# Patient Record
Sex: MALE | Race: BLACK OR AFRICAN AMERICAN
[De-identification: names, ages, dates, MRNs, and addresses within clinical notes are randomized per-mention and may not be internally consistent; named-entity substitution may affect disease eponyms.]

---

## 2018-05-15 NOTE — DIAGNOSTIC IMAGING REPORT
PROCEDURE:

Frontal and lateral views of the chest.

 

COMPARISON: None.

 

INDICATIONS:   PREOP - FOOT SX

     

FINDINGS:

Lines/tubes:  None.

 

Lungs:  The lungs are well inflated. Linear opacities in the lingula, 

likely reflecting subsegmental atelectasis or scarring. There is no 

evidence of pneumonia or pulmonary edema.

 

Pleura:  There is no pleural effusion or pneumothorax.

 

Heart and mediastinum:  The heart and the mediastinum are normal.

 

Bones:  No acute bony abnormality.

 

IMPRESSION: 

 

1.  No acute cardiopulmonary abnormalities.

 

 

Milton Kennedy M.D.  

Dictated by:  Milton Kennedy M.D. on 5/15/2018 at 14:14     

Electronically approved by:  Milton Kennedy M.D. on 5/15/2018 at 

14:14

## 2018-05-16 ENCOUNTER — HOSPITAL ENCOUNTER (OUTPATIENT)
Dept: HOSPITAL 88 - OR | Age: 68
Discharge: HOME | End: 2018-05-16
Attending: PODIATRIST
Payer: MEDICARE

## 2018-05-16 DIAGNOSIS — Z88.0: ICD-10-CM

## 2018-05-16 DIAGNOSIS — Z87.891: ICD-10-CM

## 2018-05-16 DIAGNOSIS — M67.471: Primary | ICD-10-CM

## 2018-05-16 DIAGNOSIS — I10: ICD-10-CM

## 2018-05-16 DIAGNOSIS — E03.9: ICD-10-CM

## 2018-05-16 DIAGNOSIS — Z01.818: ICD-10-CM

## 2018-05-16 DIAGNOSIS — Z86.73: ICD-10-CM

## 2018-05-16 DIAGNOSIS — Z79.82: ICD-10-CM

## 2018-05-16 PROCEDURE — 71046 X-RAY EXAM CHEST 2 VIEWS: CPT

## 2018-05-16 PROCEDURE — 28092 REMOVAL OF TOE LESIONS: CPT

## 2018-05-16 PROCEDURE — 88304 TISSUE EXAM BY PATHOLOGIST: CPT

## 2018-05-16 NOTE — XMS REPORT
Patient Summary Document

 Created on: 2018



MELANI LÓPEZ

External Reference #: [id number]

: 1950

Sex: Male



Demographics







 Address  [address] [place], [place] [address]

 

 Home Phone  [phone]

 

 Preferred Language  Unknown

 

 Marital Status  Unknown

 

 Bahai Affiliation  Unknown

 

 Race  Unknown

 

 Additional Race(s)  

 

 Ethnic Group  Unknown





Author







 [facility]  [facility]

 

 Address  Unknown

 

 Phone  Unavailable







Care Team Providers







 Care Team Member Name  Role  Phone

 

 RAN AYALA  Unavailable  Unavailable







Problems

This patient has no known problems.



Allergies, Adverse Reactions, Alerts

This patient has no known allergies or adverse reactions.



Medications

This patient has no known medications.



Results







 Test Description  Test Time  Test Comments  Text Results  Atomic Results  
Result Comments









 CHEST 2 VIEWS            [place] [address]      Patient Name: MELANI LÓPEZ   
MR #: [id number]    : 1950 Age/Sex: 67/M  Acct #: [id number] Req #: 
[id number]  Adm Physician:     Ordered by: ISAIAH GORDON MD  Report #: [id number]   
Location: OR  Room/Bed:     ____________________________________________________
_______________________________________________    Procedure: [id number] DX/
CHEST 2 VIEWS  Exam Date: 05/15/18                            Exam Time: 1355  
     REPORT STATUS: Signed    PROCEDURE:   Frontal and lateral views of the 
chest.       COMPARISON: None.       INDICATIONS:   PREOP - FOOT SX           
FINDINGS:   Lines/tubes:  None.       Lungs:  The lungs are well inflated. 
Linear opacities in the lingula,    likely reflecting subsegmental atelectasis 
or scarring. There is no    evidence of pneumonia or pulmonary edema.       
Pleura:  There is no pleural effusion or pneumothorax.       Heart and 
mediastinum:  The heart and the mediastinum are normal.       Bones:  No acute 
bony abnormality.       IMPRESSION:        1.  No acute cardiopulmonary 
abnormalities.           Sydni Kennedy M.D.     Dictated by:  Sydni Kennedy M.D. on 5/15/2018 at 14:14        Electronically approved by:  Sydni Kennedy M.D. on 5/15/2018 at    14:14                Dictated By: SYDNI KENNEDY MD  Electronically Signed By: SYDNI KENNEDY MD on 05/15/18 1414  
Transcribed By: FLAVIA on 05/15/18 1414       COPY TO:   ISAIAH GORDON MD

## 2018-05-21 NOTE — OPERATIVE REPORT
DATE OF PROCEDURE:  May 16, 2018 

 

ASSISTANT:  None.



PREOPERATIVE DIAGNOSIS:  Right foot third digit cyst, ganglion cyst 

formation.



POSTOPERATIVE DIAGNOSIS:  Right foot third digit cyst, ganglion cyst 

formation.



PROCEDURE

1. Right foot third digit excision of ganglion cyst.

2. Right foot tibial nerve block.



HEMOSTASIS:  Ankle tourniquet at 250 mmHg.



INJECTABLES:  10 mL of 0.5% Marcaine plain.



PATHOLOGY:  Cyst sent to pathology.



PROCEDURE:  After informed consent was obtained, patient was brought to the 

operating room, placed on the operating table in supine position.  General 

anesthesia was obtained.  Pneumatic tourniquet was applied to the right 

ankle.  The right lower extremity was then scrubbed, prepped and draped in 

the usual aseptic manner.  Attention was then directed to the right foot 

where it was elevated for approximately 1 minute, and the ankle tourniquet 

was inflated with 250 mmHg.  Attention was directed to the right foot third 

digit where a cystic formation was noted along the dorsal aspect of the 

proximal interphalangeal joint.  The cyst was excised in an elliptical 

fashion down to the level of bone.  All bleeders were ligated, cauterized 

as necessary.  Next, the cyst was sent to pathology as specimen.  The wound 

was irrigated with copious amounts of saline.  The wound was then coapted 

utilizing 3-0 Vicryl and 4-0 nylon.  The surgical site was then 

anesthetized utilizing 5 mL of 100% Marcaine plain followed by a right 

tibial nerve block consisting of 5 mL of 0.5% Marcaine plain.  The right 

foot was then placed in dry sterile dressing consisting of Xeroform, 4 x 

4's, Kerlix and Ace wrap.  Pneumatic ankle tourniquet was deflated and a 

prompt hyperemic response was noted to all digits of the right lower 

extremity.



Patient tolerated the procedure and anesthesia well.  Patient was then 

transferred back to her room with vital signs stable, neurovascular status 

intact to preop status.









DD:  05/21/2018 14:22

DT:  05/21/2018 15:02

Job#:  [id number] ROMANA

## 2019-06-04 LAB
BASOPHILS # BLD AUTO: 0.1 10*3/UL (ref 0–0.1)
BASOPHILS NFR BLD AUTO: 1 % (ref 0–1)
DEPRECATED NEUTROPHILS # BLD AUTO: 2.4 10*3/UL (ref 2.1–6.9)
EOSINOPHIL # BLD AUTO: 0.2 10*3/UL (ref 0–0.4)
EOSINOPHIL NFR BLD AUTO: 3.6 % (ref 0–6)
ERYTHROCYTE [DISTWIDTH] IN CORD BLOOD: 12.7 % (ref 11.7–14.4)
HCT VFR BLD AUTO: 36.2 % (ref 38.2–49.6)
HGB BLD-MCNC: 13 G/DL (ref 14–18)
LYMPHOCYTES # BLD: 2 10*3/UL (ref 1–3.2)
LYMPHOCYTES NFR BLD AUTO: 39.9 % (ref 18–39.1)
MCH RBC QN AUTO: 32.4 PG (ref 28–32)
MCHC RBC AUTO-ENTMCNC: 35.9 G/DL (ref 31–35)
MCV RBC AUTO: 90.3 FL (ref 81–99)
MONOCYTES # BLD AUTO: 0.3 10*3/UL (ref 0.2–0.8)
MONOCYTES NFR BLD AUTO: 6.6 % (ref 4.4–11.3)
NEUTS SEG NFR BLD AUTO: 48.7 % (ref 38.7–80)
PLATELET # BLD AUTO: 262 X10E3/UL (ref 140–360)
RBC # BLD AUTO: 4.01 X10E6/UL (ref 4.3–5.7)

## 2019-06-04 NOTE — DIAGNOSTIC IMAGING REPORT
EXAMINATION: PA and lateral views of the chest.



COMPARISON: None



CLINICAL HISTORY:  Preoperative study for prostate surgery

     

DISCUSSION:



Lines/tubes:  None.



Lungs:  The lungs are well inflated and clear. There is no evidence of

pneumonia or pulmonary edema.



Pleura:  There is no pleural effusion or pneumothorax.



Heart and mediastinum:  The cardiomediastinal silhouette is normal.



Bones and soft tissues:  No acute bony abnormalities.  



IMPRESSION: 

No acute cardiopulmonary abnormalities.











Signed by: Dr. Asad Caal M.D. on 6/4/2019 3:31 PM

## 2019-06-05 ENCOUNTER — HOSPITAL ENCOUNTER (OUTPATIENT)
Dept: HOSPITAL 88 - OR | Age: 69
Discharge: HOME | End: 2019-06-05
Attending: SPECIALIST
Payer: MEDICARE

## 2019-06-05 VITALS — DIASTOLIC BLOOD PRESSURE: 76 MMHG | SYSTOLIC BLOOD PRESSURE: 132 MMHG

## 2019-06-05 DIAGNOSIS — N40.1: ICD-10-CM

## 2019-06-05 DIAGNOSIS — Z88.0: ICD-10-CM

## 2019-06-05 DIAGNOSIS — Z01.810: ICD-10-CM

## 2019-06-05 DIAGNOSIS — Z72.0: ICD-10-CM

## 2019-06-05 DIAGNOSIS — R00.1: ICD-10-CM

## 2019-06-05 DIAGNOSIS — N32.3: ICD-10-CM

## 2019-06-05 DIAGNOSIS — N41.1: ICD-10-CM

## 2019-06-05 DIAGNOSIS — I10: ICD-10-CM

## 2019-06-05 DIAGNOSIS — Z86.73: ICD-10-CM

## 2019-06-05 DIAGNOSIS — N13.8: ICD-10-CM

## 2019-06-05 DIAGNOSIS — Z01.812: ICD-10-CM

## 2019-06-05 DIAGNOSIS — Z79.82: ICD-10-CM

## 2019-06-05 DIAGNOSIS — Z01.818: ICD-10-CM

## 2019-06-05 DIAGNOSIS — K21.9: ICD-10-CM

## 2019-06-05 DIAGNOSIS — N32.89: ICD-10-CM

## 2019-06-05 DIAGNOSIS — C61: Primary | ICD-10-CM

## 2019-06-05 PROCEDURE — 76998 US GUIDE INTRAOP: CPT

## 2019-06-05 PROCEDURE — 76872 US TRANSRECTAL: CPT

## 2019-06-05 PROCEDURE — 88305 TISSUE EXAM BY PATHOLOGIST: CPT

## 2019-06-05 PROCEDURE — 85025 COMPLETE CBC W/AUTO DIFF WBC: CPT

## 2019-06-05 PROCEDURE — 36415 COLL VENOUS BLD VENIPUNCTURE: CPT

## 2019-06-05 PROCEDURE — 93005 ELECTROCARDIOGRAM TRACING: CPT

## 2019-06-05 PROCEDURE — 71046 X-RAY EXAM CHEST 2 VIEWS: CPT

## 2019-06-05 NOTE — XMS REPORT
Clinical Summary

                             Created on: 2019



Keyon Mccann

External Reference #: [id number]

: 1950

Sex: Male



Demographics







                          Address                   [address] [place] [address]  [address]

 

                          Home Phone                [phone]

 

                          Preferred Language        English

 

                          Marital Status            Unknown

 

                          Muslim Affiliation     Unknown

 

                          Race                      White

 

                          Ethnic Group              Non-





Author







                          Author                    [facility]

 

                          Organization              [facility]

 

                          Address                   Unknown

 

                          Phone                     Unavailable







Support







                Name            Relationship    Address         Phone

 

                Rossy Mccann    [employer]            Unknown         [phone]







Care Team Providers







                    Care Team Member Name    Role                Phone

 

                    Miguel A Echavarria MD    PCP                 [phone]







Allergies

Not on File



Medications

Not on file



Active Problems





Not on file



Encounters







                          Care Team                 Description



                     Date                Type                Specialty  

 

                                        



Khanh Davis MD              Enlarged prostate with urinary obstruction (Primary Dx)





                     2019          Transcribe          Access  



                                         Orders   



after 2018



Social History







                                        Date



                 Tobacco Use     Types           Packs/Day       Years Used 

 

                                         



                                         Never Assessed    









 



                           Sex Assigned at Birth     Date Recorded

 

 



                                         Not on file 









                                        Industry



                           Job Start Date            Occupation 

 

                                        Not on file



                           Not on file               Not on file 









                                        Travel End



                           Travel History            Travel Start 

 





                                         No recent travel history available.







Last Filed Vital Signs

Not on file



Plan of Treatment







   



                 Health Maintenance     Due Date        Last Done       Comments

 

   



                           COLON CANCER SCREENING     2002  

 

   



                           SHINGLES VACCINES (#1)     2002  

 

   



                           65+ PNEUMOCOCCAL VACCINE     2017  



                                         (1 of 2 - PCV13)   

 

   



                           PNEUMOCOCCAL              2017  



                                         POLYSACCHARIDE VACCINE   



                                         AGE 65 AND OVER   

 

   



                           INFLUENZA VACCINE         2019  







Procedures







                                        Comments



                 Procedure Name     Priority        Date/Time       Associated Diagnosis 

 

                                        



Results for this procedure are in the results section.



                 US PROSTATE     Routine         2019      Enlarged prostate with 



                           3:07 PM CDT               urinary obstruction 



after 2018



Results

* US Prostate (2019  3:07 PM CDT)









                                         Specimen

 











 



                           Narrative                 Performed At

 

 



                           EXAMINATION:US PROSTATE      [facility]



                                         CLINICAL HISTORY:N40.1 Benign prostatic hyperplasia with lower urinary tract

 



                                         symptoms, N13.8 Other obstructive and reflux uropathy, N40.1 



                                         COMPARISON:None. 



                                         TECHNIQUE: 



                                         Transrectal ultrasound of the prostate gland is performed. 



                                         IMPRESSION: 



                                         1.The prostate gland is enlarged at 5.2 x 3.9 x 5.4 cm. 



                                         2.There are a few central dystrophic calcifications and a few small central

 



                                         prostatic cysts measuring up to 7 mm. 



                                         [id number] 









                                        Procedure Note

 

                                        



Hm Interface, Radiology Results Incoming - 2019  3:40 PM CDT



EXAMINATION:  US PROSTATE



CLINICAL HISTORY:  N40.1 Benign prostatic hyperplasia with lower urinary tract 
symptoms, N13.8 Other obstructive and reflux uropathy, N40.1



COMPARISON:  None.





TECHNIQUE:



Transrectal ultrasound of the prostate gland is performed.





IMPRESSION:



                                        1.  The prostate gland is enlarged at 5.2 x 3.9 x 5.4 cm.

                                        2.  There are a few central dystrophic calcifications and a few small central prostatic

 cysts measuring up to 7 mm.







[id number]











   



                 Performing Organization     Address         City/State/Zipcode     Phone Number

 

   



                      [facility]          [address] [place] [address] 





after 2018



Insurance







                                        Type



            Payer      Benefit     Subscriber ID     Effective     Phone      Address 



                           Plan /                    Dates   



                                         Group     

 

                                        Medicare



              MEDICARE     MEDICARE     xxxxxxxxxxx     2017-P      [place], 



                     PART A AND          resent              [place] 



                                         B     

 

                                        Commercial



                 [employer]            AAR            xxxxxxxxxxx     3/1/2019-P   



                           SUPPLEMENT                resent   









     



            Guarantor Name     Account     Relation to     Date of     Phone      Billing Address



                     Type                Patient             Birth  

 

     



            Keyon Mccann     Personal/F     Self       1950     [phone] [address] [facility]               ([place])              [place] [address]







Advance Directives





Patient has advance care planning documents on file. For more information, nataly ospina contact:



[facility]



[address] [place] [address]

## 2019-06-06 NOTE — OPERATIVE REPORT
DATE OF PROCEDURE:  06/05/2019

 

SURGEON:  Khanh Davis MD

 

PREOPERATIVE DIAGNOSES:  

1. BPH.

2. Rule out carcinoma of the prostate.

3. Abnormal PSA.

 

POSTOPERATIVE DIAGNOSES:  

1. BPH.

2. Rule out carcinoma of the prostate.

3. Abnormal PSA.

 

OPERATION:  

1. Transrectal ultrasound of the prostate.

2. Transrectal ultrasound-guided needle biopsies.

3. Cystourethroscopy.

 

ANESTHETIC:  General.

 

INDICATIONS FOR PROCEDURE:  Mr. Mccann is a 68-year-old male who was referred with a

chief complaint of lower urinary tract obstructive symptoms and elevated PSA.  Rectal

exam showed an enlarged prostate gland with a firm to hard nodule on the right base.

His PSA was abnormal. 

 

PROCEDURE IN DETAIL:  This patient was placed on the table in the lithotomy position and

transrectal ultrasound of the prostate was performed and the prostate measured 4.82 x

3.9 x 3.7 with a total volume of 41.25.  There was a hypoechoic area in the peripheral

zone on the right side that measured 1.9 x 1.6 cm.  There were multiple prostatic

calcification with post calcification shadowing.  Both seminal vesicles were

unremarkable. 

 

Transrectal ultrasound-guided needle biopsies were obtained and multiple biopsies were

obtained from the hypoechoic area and from the normal-appearing prostate to map it for

any multifocal carcinoma. 

 

This patient was then prepped and draped in a sterile manner. 

 

A #23-English cystoscope was used and cystourethroscopy was performed and the urethra was

noted to be normal. 

 

The prostatic urethra was about 3 cm long bilobar and occlusive. 

 

Cystoscopy was then performed using both right angle and the Foroblique lens and it was

noted that the bladder mucosa was normal.  Both ureteral orifices were seen and were

within normal position configuration efflux.  The bladder wall, however, was markedly

trabeculated with cellules pole over the posterior wall and several diverticula on the

posterior wall.  The bladder was drained.  Cystoscope removed and the patient was taken

to the recovery room in satisfactory condition. 

 

Plan for this patient is to be placed on Levaquin 500 mg once a day for one week. 

 

Ultracet tablet one every 6 hours p.r.n. and was given 20.  He is to return to the

office in one week when at that time, results of the biopsy will be back and then

proceed with whatever indicated procedure. 

 

 

 

 

______________________________

Khanh Davis MD MA/GRISELDA

D:  06/05/2019 14:17:31

T:  06/06/2019 12:42:54

Job #:  [id number]

## 2019-06-06 NOTE — DIAGNOSTIC IMAGING REPORT
TECHNIQUE: Transrectal ultrasound guidance of the prostate for biopsy. 



History:  Elevated PSA



FINDINGS: 

 

The gland size measures  3.2 x 5.1 x 4.8 cm. Volume is 41.3 cm3.



The peripheral zone is homogeneous without focal nodules.



The transition zone is heterogeneous in echotexture and lobulated in contour

consistent with BPH.  

 

IMPRESSION:

 As above.   



Signed by: Dr. Kimberley Tsang MD on 6/6/2019 8:10 AM

## 2019-07-10 ENCOUNTER — HOSPITAL ENCOUNTER (OUTPATIENT)
Dept: HOSPITAL 88 - OR | Age: 69
Setting detail: OBSERVATION
LOS: 2 days | Discharge: HOME | End: 2019-07-12
Attending: SPECIALIST | Admitting: SPECIALIST
Payer: MEDICARE

## 2019-07-10 VITALS — SYSTOLIC BLOOD PRESSURE: 170 MMHG | DIASTOLIC BLOOD PRESSURE: 84 MMHG

## 2019-07-10 VITALS — HEIGHT: 74 IN | WEIGHT: 185 LBS | BODY MASS INDEX: 23.74 KG/M2

## 2019-07-10 VITALS — DIASTOLIC BLOOD PRESSURE: 74 MMHG | SYSTOLIC BLOOD PRESSURE: 135 MMHG

## 2019-07-10 VITALS — SYSTOLIC BLOOD PRESSURE: 137 MMHG | DIASTOLIC BLOOD PRESSURE: 69 MMHG

## 2019-07-10 VITALS — DIASTOLIC BLOOD PRESSURE: 84 MMHG | SYSTOLIC BLOOD PRESSURE: 170 MMHG

## 2019-07-10 DIAGNOSIS — Z01.812: ICD-10-CM

## 2019-07-10 DIAGNOSIS — C61: Primary | ICD-10-CM

## 2019-07-10 DIAGNOSIS — Z88.0: ICD-10-CM

## 2019-07-10 DIAGNOSIS — N40.1: ICD-10-CM

## 2019-07-10 DIAGNOSIS — Z79.82: ICD-10-CM

## 2019-07-10 DIAGNOSIS — N13.8: ICD-10-CM

## 2019-07-10 DIAGNOSIS — Z23: ICD-10-CM

## 2019-07-10 LAB
ANION GAP SERPL CALC-SCNC: 11.7 MMOL/L (ref 8–16)
BASOPHILS # BLD AUTO: 0 10*3/UL (ref 0–0.1)
BASOPHILS NFR BLD AUTO: 0.5 % (ref 0–1)
BUN SERPL-MCNC: 15 MG/DL (ref 7–26)
BUN/CREAT SERPL: 20 (ref 6–25)
CALCIUM SERPL-MCNC: 9.2 MG/DL (ref 8.4–10.2)
CHLORIDE SERPL-SCNC: 102 MMOL/L (ref 98–107)
CO2 SERPL-SCNC: 29 MMOL/L (ref 22–29)
DEPRECATED NEUTROPHILS # BLD AUTO: 2.2 10*3/UL (ref 2.1–6.9)
EGFRCR SERPLBLD CKD-EPI 2021: > 60 ML/MIN (ref 60–?)
EOSINOPHIL # BLD AUTO: 0.2 10*3/UL (ref 0–0.4)
EOSINOPHIL NFR BLD AUTO: 4.3 % (ref 0–6)
ERYTHROCYTE [DISTWIDTH] IN CORD BLOOD: 12.3 % (ref 11.7–14.4)
GLUCOSE SERPLBLD-MCNC: 101 MG/DL (ref 74–118)
HCT VFR BLD AUTO: 33.1 % (ref 38.2–49.6)
HGB BLD-MCNC: 11.8 G/DL (ref 14–18)
LYMPHOCYTES # BLD: 1.7 10*3/UL (ref 1–3.2)
LYMPHOCYTES NFR BLD AUTO: 38.7 % (ref 18–39.1)
MCH RBC QN AUTO: 32.6 PG (ref 28–32)
MCHC RBC AUTO-ENTMCNC: 35.6 G/DL (ref 31–35)
MCV RBC AUTO: 91.4 FL (ref 81–99)
MONOCYTES # BLD AUTO: 0.3 10*3/UL (ref 0.2–0.8)
MONOCYTES NFR BLD AUTO: 7.7 % (ref 4.4–11.3)
NEUTS SEG NFR BLD AUTO: 48.3 % (ref 38.7–80)
PLATELET # BLD AUTO: 236 X10E3/UL (ref 140–360)
POTASSIUM SERPL-SCNC: 4.7 MMOL/L (ref 3.5–5.1)
RBC # BLD AUTO: 3.62 X10E6/UL (ref 4.3–5.7)
SODIUM SERPL-SCNC: 138 MMOL/L (ref 136–145)

## 2019-07-10 PROCEDURE — 88305 TISSUE EXAM BY PATHOLOGIST: CPT

## 2019-07-10 PROCEDURE — 52601 PROSTATECTOMY (TURP): CPT

## 2019-07-10 PROCEDURE — 90732 PPSV23 VACC 2 YRS+ SUBQ/IM: CPT

## 2019-07-10 PROCEDURE — 80048 BASIC METABOLIC PNL TOTAL CA: CPT

## 2019-07-10 PROCEDURE — 36415 COLL VENOUS BLD VENIPUNCTURE: CPT

## 2019-07-10 PROCEDURE — 85025 COMPLETE CBC W/AUTO DIFF WBC: CPT

## 2019-07-10 PROCEDURE — 82948 REAGENT STRIP/BLOOD GLUCOSE: CPT

## 2019-07-10 RX ADMIN — HYDROMORPHONE HYDROCHLORIDE PRN MG: 2 INJECTION INTRAMUSCULAR; INTRAVENOUS; SUBCUTANEOUS at 23:47

## 2019-07-10 RX ADMIN — TAMSULOSIN HYDROCHLORIDE SCH MG: 0.4 CAPSULE ORAL at 20:45

## 2019-07-10 RX ADMIN — HYDROMORPHONE HYDROCHLORIDE PRN MG: 2 INJECTION INTRAMUSCULAR; INTRAVENOUS; SUBCUTANEOUS at 13:42

## 2019-07-10 RX ADMIN — PANTOPRAZOLE SODIUM SCH MG: 40 TABLET, DELAYED RELEASE ORAL at 15:54

## 2019-07-10 RX ADMIN — LEVOFLOXACIN SCH MLS/HR: 5 INJECTION, SOLUTION INTRAVENOUS at 14:04

## 2019-07-10 RX ADMIN — Medication SCH MG: at 15:54

## 2019-07-10 NOTE — OPERATIVE REPORT
DATE OF PROCEDURE:  07/10/2019

 

SURGEON:  Khanh Davis MD

 

PREOPERATIVE DIAGNOSES:  

1. Adenocarcinoma of the prostate.

2. Prostatic obstruction.

 

POSTOPERATIVE DIAGNOSES:  

1. Adenocarcinoma of the prostate.

2. Prostatic obstruction.

 

OPERATION:  

1. Urethral dilation.

2. Cystourethroscopy.

3. Transurethral resection of the prostate.

 

ANESTHETIC:  General.

 

ANESTHETIST:  LEIDY Hurley.

 

INDICATION:  Mr. Mccann is a 69-year-old male, who presented with a chief complaint of

lower urinary tract obstructive symptoms and elevated PSA of 9.5.  Transrectal

ultrasound-guided needle biopsies showed an enlarged prostate gland with adenocarcinoma

of the prostate. 

 

PROCEDURE IN DETAIL:  This patient was placed on the table in the lithotomy position and

was prepped and draped in a sterile manner after satisfactory anesthesia. 

 

A #23-Peruvian cystoscope was used, but could not be passed through the urethral meatus

and for this reason, the urethra was dilated with Barnum sound up to #28-Peruvian. 

 

A #23-Peruvian cystoscope was used and cystourethroscopy was performed and confirmed the

previous cystoscopic findings of an enlarged occlusive prostate gland.  The bladder was

markedly trabeculated with cellules and diverticula. 

 

A #27-Peruvian resectoscope sheath with a [place] obturator was then passed through the

urethra to the bladder, obturator removed and the Sung resectoscope placed.

Reinspection of the bladder and the prostate confirmed the previous cystoscopic

findings. 

 

Resection of the prostate was then started starting at 7 o'clock to 11 o'clock starting

at the bladder neck to just proximal to the verumontanum and down to the capsular

fibers.  Hemostasis was obtained all through and was very adequate. 

 

Vaporization was then started from 12 o'clock again at the bladder neck to just proximal

to the verumontanum and down to the capsular fibers. 

 

Resection was then continued from 1 o'clock to 5 o'clock again starting at the bladder

neck to just proximal to the verumontanum and down to the capsular fibers.  Again,

hemostasis was very adequate. 

 

At the termination of the procedure, the [employer] evacuator was connected to the

resectoscope sheath and evacuation of all blood clots. 

 

The resectoscope was removed.  A #22-Peruvian Fernando catheter was placed on mild traction.

Estimated blood loss was about 10 mL or less.  The patient tolerated the procedure well

and was taken to the recovery room in satisfactory condition. 

 

Plan for this patient is to be admitted overnight for observation and then we will see

him in the office in a few weeks when at that time we will start his radiation therapy

and Lupron therapy. 

 

 

 

 

______________________________

Khanh Davis MD MA/GRISELDA

D:  07/10/2019 11:48:50

T:  07/10/2019 15:55:09

Job #:  [id number]

## 2019-07-10 NOTE — XMS REPORT
Clinical Summary

                             Created on: 07/10/2019



RamyKeyon

External Reference #: [id number]

: 1950

Sex: Male



Demographics







                          Address                   [address] [place] [address]  [address]

 

                          Home Phone                [phone]

 

                          Preferred Language        English

 

                          Marital Status            Unknown

 

                          Protestant Affiliation     Unknown

 

                          Race                      White

 

                          Ethnic Group              Non-





Author







                          Author                    [facility]

 

                          Organization              [facility]

 

                          Address                   Unknown

 

                          Phone                     Unavailable







Support







                Name            Relationship    Address         Phone

 

                Rossy Mccann    [employer]            Unknown         [phone]







Care Team Providers







                    Care Team Member Name    Role                Phone

 

                    Miguel A Echavarria MD    PCP                 [phone]







Allergies

Not on File



Medications

Not on file



Active Problems





Not on file



Encounters







                          Care Team                 Description



                     Date                Type                Specialty  

 

                                        



Khanh Davis MD               



                     2019          Hospital            Radiology  



                                         Encounter   

 

                                        



Khanh Davis MD              Malignant neoplasm of prostate (HCC)



                     2019          Hospital            Radiology  



                                         Encounter   

 

                                        



Khanh Davis MD              Malignant neoplasm of prostate (HCC)



                     2019          Hospital            Radiology  



                                         Encounter   

 

                                        



Khanh Davis MD              Malignant neoplasm of prostate (HCC) (Primary Dx)



                     2019          Transcribe          Access  



                                         Orders   

 

                                        



Khanh Davis MD              Enlarged prostate with urinary obstruction (Primary Dx)





                     2019          Transcribe          Access  



                                         Orders   



after 2018



Social History







                                        Date



                 Tobacco Use     Types           Packs/Day       Years Used 

 

                                         



                                         Never Assessed    









 



                           Sex Assigned at Birth     Date Recorded

 

 



                                         Not on file 









                                        Industry



                           Job Start Date            Occupation 

 

                                        Not on file



                           Not on file               Not on file 









                                        Travel End



                           Travel History            Travel Start 

 





                                         No recent travel history available.







Last Filed Vital Signs







                                        Time Taken



                           Vital Sign                Reading 

 

                                        -



                           Blood Pressure            - 

 

                                        -



                           Pulse                     - 

 

                                        -



                           Temperature               - 

 

                                        -



                           Respiratory Rate          - 

 

                                        -



                           Oxygen Saturation         - 

 

                                        -



                           Inhaled Oxygen            - 



                                         Concentration  

 

                                        2019  1:37 PM CDT



                           Weight                    83.9 kg (185 lb) 

 

                                        -



                           Height                    - 

 

                                        -



                           Body Mass Index           - 







Plan of Treatment







   



                 Health Maintenance     Due Date        Last Done       Comments

 

   



                           COLONOSCOPY SCREENING     2002  

 

   



                           SHINGLES VACCINES (#1)     2002  

 

   



                           65+ PNEUMOCOCCAL VACCINE     2017  



                                         (1 of 2 - PCV13)   

 

   



                           INFLUENZA VACCINE         2019  







Procedures







                                        Comments



                 Procedure Name     Priority        Date/Time       Associated Diagnosis 

 

                                        



Results for this procedure are in the results section.



                 NM BONE SCAN WHOLE BODY     Routine         2019      Malignant neoplasm of 



                           12:06 PM CDT              prostate (HCC) 

 

                                        



Results for this procedure are in the results section.



                 MRI PELVIS W WO CONTRAST     Routine         2019      Malignant neoplasm of 



                           1:37 PM CDT               prostate (HCC) 

 

                                        



Results for this procedure are in the results section.



                     ESTIMATED GFR       Routine             2019  



                                         12:34 PM CDT  

 

                                        



Results for this procedure are in the results section.



                     POC CREATININE      Routine             2019  



                                         12:34 PM CDT  

 

                                        



Results for this procedure are in the results section.



                     ESTIMATED GFR       Routine             2019  



                                         12:34 PM CDT  

 

                                        



Results for this procedure are in the results section.



                     POC CREATININE      Routine             2019  



                                         12:34 PM CDT  

 

                                        



Results for this procedure are in the results section.



                 US PROSTATE     Routine         2019      Enlarged prostate with 



                           3:07 PM CDT               urinary obstruction 



after 2018



Results

* NM Bone Scan Whole Body (2019 12:06 PM CDT)









                                         Specimen

 











 



                           Narrative                 Performed At

 

 



                           PROCEDURE:NM BONE SCAN WHOLE BODY      [facility]



                                         INDICATION:Malignant neoplasm of prostate. 



                                         COMPARISON:MRI of the pelvis 2019. 



                                         TECHNIQUE: Approximately three hours after the IV administration of 25 mCi of 



                                         Tc-99m labeled MDP, routine whole body planar bone scanning was performed in the

 



                                         anterior and posterior projections. 



                                         FINDINGS:No suspicious uptake is seen to suggest the presence of osseous 



                                         metastatic disease.Degenerative uptake is present at the lumbosacral 



                                         junction. 



                                         IMPRESSION: 



                                         1.No scintigraphic evidence of osseous metastatic disease. 



                                         [facility]-[id number] 









                                        Procedure Note

 

                                        



[facility], Radiology Results Incoming - 2019  1:08 PM CDT



PROCEDURE:  NM BONE SCAN WHOLE BODY



INDICATION:  Malignant neoplasm of prostate.

 

COMPARISON:  MRI of the pelvis 2019.

 

TECHNIQUE: Approximately three hours after the IV administration of 25 mCi of 
Tc-99m labeled MDP, routine whole body planar bone scanning was performed in the
anterior and posterior projections. 

 

FINDINGS:  No suspicious uptake is seen to suggest the presence of osseous 
metastatic disease.  Degenerative uptake is present at the lumbosacral junction.

 

IMPRESSION: 

 

                                        1.  No scintigraphic evidence of osseous metastatic disease.



[facility]-[id number]









   



                 Performing Organization     Address         City/State/Zipcode     Phone Number

 

   



                     [facility]          [address] [place] [address] 





* MRI Pelvis W Wo Contrast (2019  1:37 PM CDT)









                                         Specimen

 











 



                           Narrative                 Performed At

 

 



                           This result has an attachment that is not available.      [facility]



                                         EXAMINATION:MRI PELVIS W WO CONTRAST 



                                         CLINICAL HISTORY:68 years 1950 C61 Malignant neoplasm of prostate, 



                                         PROSTATE CANCER 



                                         COMPARISON:None. 



                                         TECHNIQUE: MR of the pelvis with and without intravenous gadolinium. 



                                         FINDINGS: 



                                         1.A routine MRI of the pelvis was performed. This limits evaluation for both

 



                                         local staging and tumor detection in the prostate. 



                                         2.Prostate measures 4.6 x 4.3 x 5.1 cm. Calculated volume 52.8 MLS. 



                                         3.Centered within the mid gland and extending towards the apex, there is a 





                                         3.1 cm area of diffusion restriction in the right peripheral zone anteriorly and

 



                                         laterally (series 23, image 35) likely related to patient's known prostate 



                                         cancer (PI-RADS 5). 



                                         While this exam is not tailored for local staging, given the size of this lesion

 



                                         and the amount of capsular contact, extracapsular extension on the right is 



                                         likely present. There is no definite evidence of seminal vesicle invasion. 1.5 





                                         cm seminal vesicle 



                                         cyst on the left. 



                                         4.No focal marrow abnormality. 



                                         5.No pelvic sidewall adenopathy. Some subcentimeter nodes do not meet size 





                                         criteria. Largest is a 7 mm external iliac node. There is a small amount of 



                                         fluid in the left left inguinal canal. 



                                         6.There is some prominent atherosclerotic disease in the abdominal aorta 



                                         where visualized. 



                                         7.Bladder is mildly trabeculated. 



                                         IMPRESSION: 



                                         1.Examination is not tailored for the evaluation of the prostate. A 



                                         dedicated prostate protocol MRI can be performed for detection or local staging

 



                                         as indicated. 



                                         2.3.1 cm area of diffusion restriction within the right peripheral zone in 





                                         the mid gland as described above, likely patient's described prostate cancer. 



                                         3.Additional findings as above 



                                         [id number] 









                                        Procedure Note

 

                                        



 Interface, Radiology Results Incoming - 2019  3:07 PM CDT



EXAMINATION:  MRI PELVIS W WO CONTRAST



CLINICAL HISTORY:  68 years 1950 C61 Malignant neoplasm of prostate, 
PROSTATE CANCER



COMPARISON:  None.



TECHNIQUE: MR of the pelvis with and without intravenous gadolinium.



FINDINGS:



                                        1.  A routine MRI of the pelvis was performed. This limits evaluation for both local

 staging and tumor detection in the prostate.



                                        2.  Prostate measures 4.6 x 4.3 x 5.1 cm. Calculated volume 52.8 MLS. 



                                        3.  Centered within the mid gland and extending towards the apex, there is a 3.1

 cm area of diffusion restriction in the right peripheral zone anteriorly and 
laterally (series 23, image 35) likely related to patient's known prostate 
cancer (PI-RADS 5). 

While this exam is not tailored for local staging, given the size of this lesion
and the amount of capsular contact, extracapsular extension on the right is 
likely present. There is no definite evidence of seminal vesicle invasion. 1.5 
cm seminal vesicle

 cyst on the left.



                                        4.  No focal marrow abnormality.



                                        5.  No pelvic sidewall adenopathy. Some subcentimeter nodes do not meet size criteria.

 Largest is a 7 mm external iliac node. There is a small amount of fluid in the 
left left inguinal canal.



                                        6.  There is some prominent atherosclerotic disease in the abdominal aorta where

 visualized.



                                        7.  Bladder is mildly trabeculated.





IMPRESSION:





                                        1.  Examination is not tailored for the evaluation of the prostate. A dedicated 

prostate protocol MRI can be performed for detection or local staging as 
indicated.



                                        2.  3.1 cm area of diffusion restriction within the right peripheral zone in the

 mid gland as described above, likely patient's described prostate cancer.



                                        3.  Additional findings as above











[id number]









   



                 Performing Organization     Address         City/State/Zipcode     Phone Number

 

   



                     [facility]          [address] [place] [address] 





* Estimated GFR (2019 12:34 PM CDT)



Only the most recent of 2 results within the time period is included.





    



              Component     Value        Ref Range     Performed At     Pathologist



                                         Signature

 

    



                 Estimated GFR     >=90            mL/min/1.73 m2     [facility] 



                           Comment:                  [facility] 



                           [facility] 



                                         G1   



                                         >=90 Normal or high   



                                         G2   



                                         60-89Mildly decreased   



                                         K4i90-90   



                                         Mildly to moderately   



                                         decreased   



                                         H9l53-64   



                                         Moderately to severely   



                                         decreased   



                                         G4   



                                         15-29Severely   



                                         decreased   



                                         G5   



                                         <15Kidney failure   



                                         The eGFR was calculated using   



                                         the Chronic Kidney Disease   



                                         Epidemiology Collaboration   



                                         (CKD-EPI) equation.   



                                         Interpretation is based on   



                                         recommendations of the   



                                         National Kidney   



                                         Foundation-Kidney Disease   



                                         Outcomes Quality   



                                         Initiative (NKF-KDOQI)   



                                         published in 2014.   













                                         Specimen

 





                                         Blood









   



                 Performing Organization     Address         City/State/Zipcode     Phone Number

 

   



                     [facility]     [address] [place] [address] 



                                         PATHOLOGY AND GENOMIC   



                                         MEDICINE   

 

   



                     [facility]     [address] [place] [address] 



                                         HOSPITAL   





* POC creatinine (2019 12:34 PM CDT)



Only the most recent of 2 results within the time period is included.





    



              Component     Value        Ref Range     Performed At     Pathologist



                                         Signature

 

    



                 POC creatinine     0.8             0.7 - 1.2 mg/dl     [facility] 













                                         Specimen

 





                                         Blood









   



                 Performing Organization     Address         City/State/Zipcode     Phone Number

 

   



                     [facility]     [address] [place] [address] 



                                         PATHOLOGY AND GENOMIC   



                                         MEDICINE   

 

   



                     [facility]     [address] [place] [address] 



                                         HOSPITAL   





* US Prostate (2019  3:07 PM CDT)









                                         Specimen

 











 



                           Narrative                 Performed At

 

 



                           EXAMINATION:US PROSTATE      [facility]



                                         CLINICAL HISTORY:N40.1 Benign prostatic hyperplasia with lower urinary tract

 



                                         symptoms, N13.8 Other obstructive and reflux uropathy, N40.1 



                                         COMPARISON:None. 



                                         TECHNIQUE: 



                                         Transrectal ultrasound of the prostate gland is performed. 



                                         IMPRESSION: 



                                         1.The prostate gland is enlarged at 5.2 x 3.9 x 5.4 cm. 



                                         2.There are a few central dystrophic calcifications and a few small central

 



                                         prostatic cysts measuring up to 7 mm. 



                                         [id number] 









                                        Procedure Note

 

                                        



 Interface, Radiology Results Incoming - 2019  3:40 PM CDT



EXAMINATION:  US PROSTATE



CLINICAL HISTORY:  N40.1 Benign prostatic hyperplasia with lower urinary tract 
symptoms, N13.8 Other obstructive and reflux uropathy, N40.1



COMPARISON:  None.





TECHNIQUE:



Transrectal ultrasound of the prostate gland is performed.





IMPRESSION:



                                        1.  The prostate gland is enlarged at 5.2 x 3.9 x 5.4 cm.

                                        2.  There are a few central dystrophic calcifications and a few small central prostatic

 cysts measuring up to 7 mm.







[id number]











   



                 Performing Organization     Address         City/State/Zipcode     Phone Number

 

   



                      [facility]          [address] [place] [address] 





after 2018



Insurance







                                        Type



            Payer      Benefit     Subscriber ID     Effective     Phone      Address 



                           Plan /                    Dates   



                                         Group     

 

                                        Medicare



              MEDICARE     MEDICARE     xxxxxxxxxxx     [id number]      [place], 



                     PART [place] AND          resent              [place] 



                                         B     

 

                                        Commercial



                 AARP            AARP            xxxxxxxxxxx     3/1/2019-P   



                           SUPPLEMENT                resent   









     



            Guarantor Name     Account     Relation to     Date of     Phone      Billing Address



                     Type                Patient             Birth  

 

     



            Keyon Mccann     Personal/F     Self       1950     [phone]     [address]



                     amily               (Home)              [place] [address]







Advance Directives





Patient has advance care planning documents on file. For more information, nataly ospina contact:



Adam Dc



[address] [place] [address]

## 2019-07-10 NOTE — XMS REPORT
Clinical Summary

                             Created on: 07/10/2019



RamyKeyon

External Reference #: [id number]

: 1950

Sex: Male



Demographics







                          Address                   [address] [place] [address]  [id number]

 

                          Home Phone                [phone]

 

                          Preferred Language        English

 

                          Marital Status            Unknown

 

                          Mormon Affiliation     Unknown

 

                          Race                      White

 

                          Ethnic Group              Non-





Author







                          Author                    [facility]

 

                          Organization              [facility]

 

                          Address                   Unknown

 

                          Phone                     Unavailable







Support







                Name            Relationship    Address         Phone

 

                Rossy Mccann    [employer]            Unknown         [phone]







Care Team Providers







                    Care Team Member Name    Role                Phone

 

                    Miguel A Echavarria MD    PCP                 [phone]







Allergies

Not on File



Medications

Not on file



Active Problems





Not on file



Encounters







                          Care Team                 Description



                     Date                Type                Specialty  

 

                                        



Khanh Davis MD               



                     2019          Hospital            Radiology  



                                         Encounter   

 

                                        



Khanh Davis MD              Malignant neoplasm of prostate (HCC)



                     2019          Hospital            Radiology  



                                         Encounter   

 

                                        



Khanh Davis MD              Malignant neoplasm of prostate (HCC)



                     2019          Hospital            Radiology  



                                         Encounter   

 

                                        



Khanh Davis MD              Malignant neoplasm of prostate (HCC) (Primary Dx)



                     2019          Transcribe          Access  



                                         Orders   

 

                                        



Khanh Davis MD              Enlarged prostate with urinary obstruction (Primary Dx)





                     2019          Transcribe          Access  



                                         Orders   



after 2018



Social History







                                        Date



                 Tobacco Use     Types           Packs/Day       Years Used 

 

                                         



                                         Never Assessed    









 



                           Sex Assigned at Birth     Date Recorded

 

 



                                         Not on file 









                                        Industry



                           Job Start Date            Occupation 

 

                                        Not on file



                           Not on file               Not on file 









                                        Travel End



                           Travel History            Travel Start 

 





                                         No recent travel history available.







Last Filed Vital Signs







                                        Time Taken



                           Vital Sign                Reading 

 

                                        -



                           Blood Pressure            - 

 

                                        -



                           Pulse                     - 

 

                                        -



                           Temperature               - 

 

                                        -



                           Respiratory Rate          - 

 

                                        -



                           Oxygen Saturation         - 

 

                                        -



                           Inhaled Oxygen            - 



                                         Concentration  

 

                                        2019  1:37 PM CDT



                           Weight                    83.9 kg (185 lb) 

 

                                        -



                           Height                    - 

 

                                        -



                           Body Mass Index           - 







Plan of Treatment







   



                 Health Maintenance     Due Date        Last Done       Comments

 

   



                           COLONOSCOPY SCREENING     2002  

 

   



                           SHINGLES VACCINES (#1)     2002  

 

   



                           65+ PNEUMOCOCCAL VACCINE     2017  



                                         (1 of 2 - PCV13)   

 

   



                           INFLUENZA VACCINE         2019  







Procedures







                                        Comments



                 Procedure Name     Priority        Date/Time       Associated Diagnosis 

 

                                        



Results for this procedure are in the results section.



                 NM BONE SCAN WHOLE BODY     Routine         2019      Malignant neoplasm of 



                           12:06 PM CDT              prostate (HCC) 

 

                                        



Results for this procedure are in the results section.



                 MRI PELVIS W WO CONTRAST     Routine         2019      Malignant neoplasm of 



                           1:37 PM CDT               prostate (HCC) 

 

                                        



Results for this procedure are in the results section.



                     ESTIMATED GFR       Routine             2019  



                                         12:34 PM CDT  

 

                                        



Results for this procedure are in the results section.



                     POC CREATININE      Routine             2019  



                                         12:34 PM CDT  

 

                                        



Results for this procedure are in the results section.



                     ESTIMATED GFR       Routine             2019  



                                         12:34 PM CDT  

 

                                        



Results for this procedure are in the results section.



                     POC CREATININE      Routine             2019  



                                         12:34 PM CDT  

 

                                        



Results for this procedure are in the results section.



                 US PROSTATE     Routine         2019      Enlarged prostate with 



                           3:07 PM CDT               urinary obstruction 



after 2018



Results

* NM Bone Scan Whole Body (2019 12:06 PM CDT)









                                         Specimen

 











 



                           Narrative                 Performed At

 

 



                           PROCEDURE:NM BONE SCAN WHOLE BODY      [facility]



                                         INDICATION:Malignant neoplasm of prostate. 



                                         COMPARISON:MRI of the pelvis 2019. 



                                         TECHNIQUE: Approximately three hours after the IV administration of 25 mCi of 



                                         Tc-99m labeled MDP, routine whole body planar bone scanning was performed in the

 



                                         anterior and posterior projections. 



                                         FINDINGS:No suspicious uptake is seen to suggest the presence of osseous 



                                         metastatic disease.Degenerative uptake is present at the lumbosacral 



                                         junction. 



                                         IMPRESSION: 



                                         1.No scintigraphic evidence of osseous metastatic disease. 



                                         [facility]-[id number] 









                                        Procedure Note

 

                                        



[facility], Radiology Results Incoming - 2019  1:08 PM CDT



PROCEDURE:  NM BONE SCAN WHOLE BODY



INDICATION:  Malignant neoplasm of prostate.

 

COMPARISON:  MRI of the pelvis 2019.

 

TECHNIQUE: Approximately three hours after the IV administration of 25 mCi of 
Tc-99m labeled MDP, routine whole body planar bone scanning was performed in the
anterior and posterior projections. 

 

FINDINGS:  No suspicious uptake is seen to suggest the presence of osseous 
metastatic disease.  Degenerative uptake is present at the lumbosacral junction.

 

IMPRESSION: 

 

                                        1.  No scintigraphic evidence of osseous metastatic disease.



[facility]-[id number]









   



                 Performing Organization     Address         City/State/Zipcode     Phone Number

 

   



                     [facility]          [address] [place] [address] 





* MRI Pelvis W Wo Contrast (2019  1:37 PM CDT)









                                         Specimen

 











 



                           Narrative                 Performed At

 

 



                           This result has an attachment that is not available.      [facility]



                                         EXAMINATION:MRI PELVIS W WO CONTRAST 



                                         CLINICAL HISTORY:68 years 1950 C61 Malignant neoplasm of prostate, 



                                         PROSTATE CANCER 



                                         COMPARISON:None. 



                                         TECHNIQUE: MR of the pelvis with and without intravenous gadolinium. 



                                         FINDINGS: 



                                         1.A routine MRI of the pelvis was performed. This limits evaluation for both

 



                                         local staging and tumor detection in the prostate. 



                                         2.Prostate measures 4.6 x 4.3 x 5.1 cm. Calculated volume 52.8 MLS. 



                                         3.Centered within the mid gland and extending towards the apex, there is a 





                                         3.1 cm area of diffusion restriction in the right peripheral zone anteriorly and

 



                                         laterally (series 23, image 35) likely related to patient's known prostate 



                                         cancer (PI-RADS 5). 



                                         While this exam is not tailored for local staging, given the size of this lesion

 



                                         and the amount of capsular contact, extracapsular extension on the right is 



                                         likely present. There is no definite evidence of seminal vesicle invasion. 1.5 





                                         cm seminal vesicle 



                                         cyst on the left. 



                                         4.No focal marrow abnormality. 



                                         5.No pelvic sidewall adenopathy. Some subcentimeter nodes do not meet size 





                                         criteria. Largest is a 7 mm external iliac node. There is a small amount of 



                                         fluid in the left left inguinal canal. 



                                         6.There is some prominent atherosclerotic disease in the abdominal aorta 



                                         where visualized. 



                                         7.Bladder is mildly trabeculated. 



                                         IMPRESSION: 



                                         1.Examination is not tailored for the evaluation of the prostate. A 



                                         dedicated prostate protocol MRI can be performed for detection or local staging

 



                                         as indicated. 



                                         2.3.1 cm area of diffusion restriction within the right peripheral zone in 





                                         the mid gland as described above, likely patient's described prostate cancer. 



                                         3.Additional findings as above 



                                         [id number] 









                                        Procedure Note

 

                                        



 Interface, Radiology Results Incoming - 2019  3:07 PM CDT



EXAMINATION:  MRI PELVIS W WO CONTRAST



CLINICAL HISTORY:  68 years 1950 C61 Malignant neoplasm of prostate, 
PROSTATE CANCER



COMPARISON:  None.



TECHNIQUE: MR of the pelvis with and without intravenous gadolinium.



FINDINGS:



                                        1.  A routine MRI of the pelvis was performed. This limits evaluation for both local

 staging and tumor detection in the prostate.



                                        2.  Prostate measures 4.6 x 4.3 x 5.1 cm. Calculated volume 52.8 MLS. 



                                        3.  Centered within the mid gland and extending towards the apex, there is a 3.1

 cm area of diffusion restriction in the right peripheral zone anteriorly and 
laterally (series 23, image 35) likely related to patient's known prostate 
cancer (PI-RADS 5). 

While this exam is not tailored for local staging, given the size of this lesion
and the amount of capsular contact, extracapsular extension on the right is 
likely present. There is no definite evidence of seminal vesicle invasion. 1.5 
cm seminal vesicle

 cyst on the left.



                                        4.  No focal marrow abnormality.



                                        5.  No pelvic sidewall adenopathy. Some subcentimeter nodes do not meet size criteria.

 Largest is a 7 mm external iliac node. There is a small amount of fluid in the 
left left inguinal canal.



                                        6.  There is some prominent atherosclerotic disease in the abdominal aorta where

 visualized.



                                        7.  Bladder is mildly trabeculated.





IMPRESSION:





                                        1.  Examination is not tailored for the evaluation of the prostate. A dedicated 

prostate protocol MRI can be performed for detection or local staging as 
indicated.



                                        2.  3.1 cm area of diffusion restriction within the right peripheral zone in the

 mid gland as described above, likely patient's described prostate cancer.



                                        3.  Additional findings as above











[id number]









   



                 Performing Organization     Address         City/State/Zipcode     Phone Number

 

   



                     [facility]          [address] [place] [address] 





* Estimated GFR (2019 12:34 PM CDT)



Only the most recent of 2 results within the time period is included.





    



              Component     Value        Ref Range     Performed At     Pathologist



                                         Signature

 

    



                 Estimated GFR     >=90            mL/min/1.73 m2     [facility] 



                           Comment:                  [facility] 



                           [facility] 



                                         G1   



                                         >=90 Normal or high   



                                         G2   



                                         60-89Mildly decreased   



                                         J2a68-41   



                                         Mildly to moderately   



                                         decreased   



                                         F5j45-84   



                                         Moderately to severely   



                                         decreased   



                                         G4   



                                         15-29Severely   



                                         decreased   



                                         G5   



                                         <15Kidney failure   



                                         The eGFR was calculated using   



                                         the Chronic Kidney Disease   



                                         Epidemiology Collaboration   



                                         (CKD-EPI) equation.   



                                         Interpretation is based on   



                                         recommendations of the   



                                         National Kidney   



                                         Foundation-Kidney Disease   



                                         Outcomes Quality   



                                         Initiative (NKF-KDOQI)   



                                         published in 2014.   













                                         Specimen

 





                                         Blood









   



                 Performing Organization     Address         City/State/Zipcode     Phone Number

 

   



                     [facility]     [address] [place] [address] 



                                         PATHOLOGY AND GENOMIC   



                                         MEDICINE   

 

   



                     [facility]     [address] [place] [address] 



                                         HOSPITAL   





* POC creatinine (2019 12:34 PM CDT)



Only the most recent of 2 results within the time period is included.





    



              Component     Value        Ref Range     Performed At     Pathologist



                                         Signature

 

    



                 POC creatinine     0.8             0.7 - 1.2 mg/dl     [facility] [place] [place] 













                                         Specimen

 





                                         Blood









   



                 Performing Organization     Address         City/State/Zipcode     Phone Number

 

   



                     [facility]     [address] [place] [address] 



                                         PATHOLOGY AND GENOMIC   



                                         MEDICINE   

 

   



                     [facility]     [address] [place] [address] 



                                         HOSPITAL   





* US Prostate (2019  3:07 PM CDT)









                                         Specimen

 











 



                           Narrative                 Performed At

 

 



                           EXAMINATION:US PROSTATE      [facility]



                                         CLINICAL HISTORY:N40.1 Benign prostatic hyperplasia with lower urinary tract

 



                                         symptoms, N13.8 Other obstructive and reflux uropathy, N40.1 



                                         COMPARISON:None. 



                                         TECHNIQUE: 



                                         Transrectal ultrasound of the prostate gland is performed. 



                                         IMPRESSION: 



                                         1.The prostate gland is enlarged at 5.2 x 3.9 x 5.4 cm. 



                                         2.There are a few central dystrophic calcifications and a few small central

 



                                         prostatic cysts measuring up to 7 mm. 



                                         [id number] 









                                        Procedure Note

 

                                        



 Interface, Radiology Results Incoming - 2019  3:40 PM CDT



EXAMINATION:  US PROSTATE



CLINICAL HISTORY:  N40.1 Benign prostatic hyperplasia with lower urinary tract 
symptoms, N13.8 Other obstructive and reflux uropathy, N40.1



COMPARISON:  None.





TECHNIQUE:



Transrectal ultrasound of the prostate gland is performed.





IMPRESSION:



                                        1.  The prostate gland is enlarged at 5.2 x 3.9 x 5.4 cm.

                                        2.  There are a few central dystrophic calcifications and a few small central prostatic

 cysts measuring up to 7 mm.







[id number]











   



                 Performing Organization     Address         City/State/Zipcode     Phone Number

 

   



                      [facility]          [address] [place] [address] 





after 2018



Insurance







                                        Type



            Payer      Benefit     Subscriber ID     Effective     Phone      Address 



                           Plan /                    Dates   



                                         Group     

 

                                        Medicare



              MEDICARE     MEDICARE     xxxxxxxxxxx     [id number]      [place], 



                     PART [place] AND          resent              [place] 



                                         B     

 

                                        Commercial



                 AARP            AARP            xxxxxxxxxxx     3/1/2019-P   



                           SUPPLEMENT                resent   









     



            Guarantor Name     Account     Relation to     Date of     Phone      Billing Address



                     Type                Patient             Birth  

 

     



            Keyon Mccann     Personal/F     Self       1950     [phone]     [address]



                     amily               (Home)              [place] [address]







Advance Directives





Patient has advance care planning documents on file. For more information, nataly ospina contact:



Adam Dc



[address] [place] [address]

## 2019-07-11 VITALS — SYSTOLIC BLOOD PRESSURE: 140 MMHG | DIASTOLIC BLOOD PRESSURE: 74 MMHG

## 2019-07-11 VITALS — SYSTOLIC BLOOD PRESSURE: 136 MMHG | DIASTOLIC BLOOD PRESSURE: 72 MMHG

## 2019-07-11 VITALS — DIASTOLIC BLOOD PRESSURE: 65 MMHG | SYSTOLIC BLOOD PRESSURE: 124 MMHG

## 2019-07-11 VITALS — DIASTOLIC BLOOD PRESSURE: 72 MMHG | SYSTOLIC BLOOD PRESSURE: 136 MMHG

## 2019-07-11 VITALS — DIASTOLIC BLOOD PRESSURE: 70 MMHG | SYSTOLIC BLOOD PRESSURE: 140 MMHG

## 2019-07-11 VITALS — DIASTOLIC BLOOD PRESSURE: 81 MMHG | SYSTOLIC BLOOD PRESSURE: 139 MMHG

## 2019-07-11 VITALS — SYSTOLIC BLOOD PRESSURE: 133 MMHG | DIASTOLIC BLOOD PRESSURE: 70 MMHG

## 2019-07-11 LAB
ANION GAP SERPL CALC-SCNC: 12.7 MMOL/L (ref 8–16)
BASOPHILS # BLD AUTO: 0 10*3/UL (ref 0–0.1)
BASOPHILS NFR BLD AUTO: 0.2 % (ref 0–1)
BUN SERPL-MCNC: 10 MG/DL (ref 7–26)
BUN/CREAT SERPL: 13 (ref 6–25)
CALCIUM SERPL-MCNC: 8.8 MG/DL (ref 8.4–10.2)
CHLORIDE SERPL-SCNC: 99 MMOL/L (ref 98–107)
CO2 SERPL-SCNC: 28 MMOL/L (ref 22–29)
DEPRECATED NEUTROPHILS # BLD AUTO: 5.8 10*3/UL (ref 2.1–6.9)
EGFRCR SERPLBLD CKD-EPI 2021: > 60 ML/MIN (ref 60–?)
EOSINOPHIL # BLD AUTO: 0.1 10*3/UL (ref 0–0.4)
EOSINOPHIL NFR BLD AUTO: 1.3 % (ref 0–6)
ERYTHROCYTE [DISTWIDTH] IN CORD BLOOD: 12.1 % (ref 11.7–14.4)
GLUCOSE SERPLBLD-MCNC: 88 MG/DL (ref 74–118)
HCT VFR BLD AUTO: 33 % (ref 38.2–49.6)
HGB BLD-MCNC: 11.5 G/DL (ref 14–18)
LYMPHOCYTES # BLD: 2 10*3/UL (ref 1–3.2)
LYMPHOCYTES NFR BLD AUTO: 23.3 % (ref 18–39.1)
MCH RBC QN AUTO: 31.7 PG (ref 28–32)
MCHC RBC AUTO-ENTMCNC: 34.8 G/DL (ref 31–35)
MCV RBC AUTO: 90.9 FL (ref 81–99)
MONOCYTES # BLD AUTO: 0.6 10*3/UL (ref 0.2–0.8)
MONOCYTES NFR BLD AUTO: 7.1 % (ref 4.4–11.3)
NEUTS SEG NFR BLD AUTO: 67.9 % (ref 38.7–80)
PLATELET # BLD AUTO: 223 X10E3/UL (ref 140–360)
POTASSIUM SERPL-SCNC: 3.7 MMOL/L (ref 3.5–5.1)
RBC # BLD AUTO: 3.63 X10E6/UL (ref 4.3–5.7)
SODIUM SERPL-SCNC: 136 MMOL/L (ref 136–145)

## 2019-07-11 RX ADMIN — Medication SCH MCG: at 09:46

## 2019-07-11 RX ADMIN — Medication SCH MG: at 09:46

## 2019-07-11 RX ADMIN — TAMSULOSIN HYDROCHLORIDE SCH MG: 0.4 CAPSULE ORAL at 20:26

## 2019-07-11 RX ADMIN — LEVOFLOXACIN SCH MLS/HR: 5 INJECTION, SOLUTION INTRAVENOUS at 14:00

## 2019-07-11 RX ADMIN — PANTOPRAZOLE SODIUM SCH MG: 40 TABLET, DELAYED RELEASE ORAL at 09:46

## 2019-07-12 VITALS — SYSTOLIC BLOOD PRESSURE: 142 MMHG | DIASTOLIC BLOOD PRESSURE: 73 MMHG

## 2019-07-12 VITALS — DIASTOLIC BLOOD PRESSURE: 73 MMHG | SYSTOLIC BLOOD PRESSURE: 142 MMHG

## 2019-07-12 VITALS — SYSTOLIC BLOOD PRESSURE: 125 MMHG | DIASTOLIC BLOOD PRESSURE: 79 MMHG

## 2019-07-12 VITALS — DIASTOLIC BLOOD PRESSURE: 71 MMHG | SYSTOLIC BLOOD PRESSURE: 146 MMHG

## 2019-07-12 RX ADMIN — Medication SCH MG: at 09:00

## 2019-07-12 RX ADMIN — PANTOPRAZOLE SODIUM SCH MG: 40 TABLET, DELAYED RELEASE ORAL at 07:30

## 2019-07-12 RX ADMIN — Medication SCH MCG: at 09:00

## 2019-07-12 NOTE — NUR
GAVE REPORT TO ONCOMING NURSE. PATIENT SHOWERED AND IN BED. CALL LIGHT WITHIN REACH. WIFE AT 
BEDSIDE. PATIENT WAS TOLD TO LET THE NURSE KNOW WHEN HE VOIDS AND PATIENT UNDERSTOOD.

## 2019-07-12 NOTE — NUR
RCD PT AT BED PT IS ALERT AND ORIENTED PT RESTING ON BED NO SIGNS OF ANY DISTRESS NOTED IV 
PATENT FAMILY AT BED SIDE BED LOW AND LOCKED CALL LIGHT IN REACH

## 2025-01-03 ENCOUNTER — HOSPITAL ENCOUNTER (EMERGENCY)
Facility: HOSPITAL | Age: 75
Discharge: HOME | End: 2025-01-03
Attending: INTERNAL MEDICINE
Payer: MEDICARE

## 2025-01-03 VITALS
WEIGHT: 175 LBS | OXYGEN SATURATION: 97 % | TEMPERATURE: 99 F | RESPIRATION RATE: 17 BRPM | HEART RATE: 88 BPM | BODY MASS INDEX: 22.46 KG/M2 | HEIGHT: 74 IN | SYSTOLIC BLOOD PRESSURE: 177 MMHG | DIASTOLIC BLOOD PRESSURE: 99 MMHG

## 2025-01-03 DIAGNOSIS — N40.0 BENIGN PROSTATIC HYPERPLASIA, UNSPECIFIED WHETHER LOWER URINARY TRACT SYMPTOMS PRESENT: Primary | ICD-10-CM

## 2025-01-03 DIAGNOSIS — N40.1 URINARY RETENTION DUE TO BENIGN PROSTATIC HYPERPLASIA: ICD-10-CM

## 2025-01-03 DIAGNOSIS — R33.8 URINARY RETENTION DUE TO BENIGN PROSTATIC HYPERPLASIA: ICD-10-CM

## 2025-01-03 LAB
ALBUMIN SERPL-MCNC: 3.7 G/DL (ref 3.4–4.8)
ALBUMIN/GLOB SERPL: 0.9 RATIO (ref 1.1–2)
ALP SERPL-CCNC: 112 UNIT/L (ref 40–150)
ALT SERPL-CCNC: 13 UNIT/L (ref 0–55)
ANION GAP SERPL CALC-SCNC: 10 MEQ/L
AST SERPL-CCNC: 11 UNIT/L (ref 5–34)
BASOPHILS # BLD AUTO: 0.06 X10(3)/MCL
BASOPHILS NFR BLD AUTO: 1 %
BILIRUB SERPL-MCNC: 0.5 MG/DL
BUN SERPL-MCNC: 17.5 MG/DL (ref 8.4–25.7)
CALCIUM SERPL-MCNC: 10.4 MG/DL (ref 8.8–10)
CHLORIDE SERPL-SCNC: 101 MMOL/L (ref 98–107)
CO2 SERPL-SCNC: 25 MMOL/L (ref 23–31)
CREAT SERPL-MCNC: 0.83 MG/DL (ref 0.72–1.25)
CREAT/UREA NIT SERPL: 21
EOSINOPHIL # BLD AUTO: 0.37 X10(3)/MCL (ref 0–0.9)
EOSINOPHIL NFR BLD AUTO: 6.4 %
ERYTHROCYTE [DISTWIDTH] IN BLOOD BY AUTOMATED COUNT: 12.3 % (ref 11.5–17)
GFR SERPLBLD CREATININE-BSD FMLA CKD-EPI: >60 ML/MIN/1.73/M2
GLOBULIN SER-MCNC: 4.2 GM/DL (ref 2.4–3.5)
GLUCOSE SERPL-MCNC: 108 MG/DL (ref 82–115)
HCT VFR BLD AUTO: 30.6 % (ref 42–52)
HGB BLD-MCNC: 10.6 G/DL (ref 14–18)
IMM GRANULOCYTES # BLD AUTO: 0.03 X10(3)/MCL (ref 0–0.04)
IMM GRANULOCYTES NFR BLD AUTO: 0.5 %
LYMPHOCYTES # BLD AUTO: 1.3 X10(3)/MCL (ref 0.6–4.6)
LYMPHOCYTES NFR BLD AUTO: 22.5 %
MCH RBC QN AUTO: 31.5 PG (ref 27–31)
MCHC RBC AUTO-ENTMCNC: 34.6 G/DL (ref 33–36)
MCV RBC AUTO: 91.1 FL (ref 80–94)
MONOCYTES # BLD AUTO: 0.45 X10(3)/MCL (ref 0.1–1.3)
MONOCYTES NFR BLD AUTO: 7.8 %
NEUTROPHILS # BLD AUTO: 3.58 X10(3)/MCL (ref 2.1–9.2)
NEUTROPHILS NFR BLD AUTO: 61.8 %
NRBC BLD AUTO-RTO: 0 %
PLATELET # BLD AUTO: 301 X10(3)/MCL (ref 130–400)
PMV BLD AUTO: 9.1 FL (ref 7.4–10.4)
POTASSIUM SERPL-SCNC: 3.5 MMOL/L (ref 3.5–5.1)
PROT SERPL-MCNC: 7.9 GM/DL (ref 5.8–7.6)
RBC # BLD AUTO: 3.36 X10(6)/MCL (ref 4.7–6.1)
SODIUM SERPL-SCNC: 136 MMOL/L (ref 136–145)
WBC # BLD AUTO: 5.79 X10(3)/MCL (ref 4.5–11.5)

## 2025-01-03 PROCEDURE — 51702 INSERT TEMP BLADDER CATH: CPT

## 2025-01-03 PROCEDURE — 25000003 PHARM REV CODE 250: Performed by: INTERNAL MEDICINE

## 2025-01-03 PROCEDURE — 99283 EMERGENCY DEPT VISIT LOW MDM: CPT | Mod: 25

## 2025-01-03 PROCEDURE — 80053 COMPREHEN METABOLIC PANEL: CPT | Performed by: INTERNAL MEDICINE

## 2025-01-03 PROCEDURE — 85025 COMPLETE CBC W/AUTO DIFF WBC: CPT | Performed by: INTERNAL MEDICINE

## 2025-01-03 RX ORDER — LIDOCAINE HYDROCHLORIDE 20 MG/ML
JELLY TOPICAL ONCE
Status: COMPLETED | OUTPATIENT
Start: 2025-01-03 | End: 2025-01-03

## 2025-01-03 RX ADMIN — LIDOCAINE HYDROCHLORIDE 11 ML: 20 JELLY TOPICAL at 09:01

## 2025-01-04 NOTE — ED PROVIDER NOTES
Encounter Date: 1/3/2025       History     Chief Complaint   Patient presents with    Llanes Catheter Issue     Llanes catheter placed in [place], fell out today. Has follow up with urologist in [place]     Presents after he fell and dislodge his llanes catheter around 3 PM today. States Hx of BPH requiring llanes catheter due to urinary retention. Llanes was placed approx 3 weeks ago at [facility]. States they were having problems placing the catheter for which the urologist was called and placed under cytoscopy. He was able to urinate after llanes dislodge but was gross hematuria. States unable to urinate now with the urge to urinate. Taking Flomax    The history is provided by the patient.     Review of patient's allergies indicates:   Allergen Reactions    Pcn [penicillins]      History reviewed. No pertinent past medical history.  No past surgical history on file.  No family history on file.     Review of Systems   Genitourinary:  Positive for difficulty urinating and hematuria.       Physical Exam     Initial Vitals [01/03/25 2115]   BP Pulse Resp Temp SpO2   (!) 177/99 88 17 98.7 °F (37.1 °C) 97 %      MAP       --         Physical Exam    Nursing note and vitals reviewed.  Constitutional: He appears well-developed and well-nourished.   HENT:   Head: Normocephalic and atraumatic.   Eyes: Conjunctivae are normal. Pupils are equal, round, and reactive to light.   Neck: Neck supple.   Normal range of motion.  Cardiovascular:  Regular rhythm, normal heart sounds and intact distal pulses.           Pulmonary/Chest: Breath sounds normal. No respiratory distress.   Abdominal: Abdomen is soft. Bowel sounds are normal. He exhibits no distension. There is no abdominal tenderness. There is no rebound and no guarding.   Musculoskeletal:         General: No edema. Normal range of motion.      Cervical back: Normal range of motion and neck supple.     Neurological: He is alert and oriented to person, place, and time. He has normal  strength.   Skin: Skin is warm and dry. No rash noted.   Psychiatric: Thought content normal.         ED Course   Procedures  Labs Reviewed   COMPREHENSIVE METABOLIC PANEL - Abnormal       Result Value    Sodium 136      Potassium 3.5      Chloride 101      CO2 25      Glucose 108      Blood Urea Nitrogen 17.5      Creatinine 0.83      Calcium 10.4 (*)     Protein Total 7.9 (*)     Albumin 3.7      Globulin 4.2 (*)     Albumin/Globulin Ratio 0.9 (*)     Bilirubin Total 0.5            ALT 13      AST 11      eGFR >60      Anion Gap 10.0      BUN/Creatinine Ratio 21     CBC WITH DIFFERENTIAL - Abnormal    WBC 5.79      RBC 3.36 (*)     Hgb 10.6 (*)     Hct 30.6 (*)     MCV 91.1      MCH 31.5 (*)     MCHC 34.6      RDW 12.3      Platelet 301      MPV 9.1      Neut % 61.8      Lymph % 22.5      Mono % 7.8      Eos % 6.4      Basophil % 1.0      Lymph # 1.30      Neut # 3.58      Mono # 0.45      Eos # 0.37      Baso # 0.06      IG# 0.03      IG% 0.5      NRBC% 0.0     CBC W/ AUTO DIFFERENTIAL    Narrative:     The following orders were created for panel order CBC auto differential.  Procedure                               Abnormality         Status                     ---------                               -----------         ------                     CBC with Differential[[id number]]       Abnormal            Final result                 Please view results for these tests on the individual orders.          Imaging Results    None          Medications   LIDOcaine HCl 2% urojet (11 mLs Urethral Given 1/3/25 2153)     Medical Decision Making  Amount and/or Complexity of Data Reviewed  Labs: ordered.    Risk  Prescription drug management.                           10:28 PM    Gold in place, clear urine           Clinical Impression:  Final diagnoses:  [N40.0] Benign prostatic hyperplasia, unspecified whether lower urinary tract symptoms present (Primary)  [N40.1, R33.8] Urinary retention due to benign prostatic  hyperplasia          ED Disposition Condition    Discharge Stable          ED Prescriptions    None       Follow-up Information       Follow up With Specialties Details Why Contact Info    [facility] Emergency Medicine  If symptoms worsen [address] [facility] [place] [address] [phone]    Your Urologist in [place]  Go on 1/9/2025               Nilson Jimenez MD  01/03/25 2226       Nilson Jimenez MD  01/03/25 2227